# Patient Record
Sex: FEMALE | Race: WHITE | NOT HISPANIC OR LATINO | URBAN - METROPOLITAN AREA
[De-identification: names, ages, dates, MRNs, and addresses within clinical notes are randomized per-mention and may not be internally consistent; named-entity substitution may affect disease eponyms.]

---

## 2020-11-09 PROBLEM — Z00.00 ENCOUNTER FOR PREVENTIVE HEALTH EXAMINATION: Status: ACTIVE | Noted: 2020-11-09

## 2020-11-11 ENCOUNTER — OUTPATIENT (OUTPATIENT)
Dept: OUTPATIENT SERVICES | Facility: HOSPITAL | Age: 45
LOS: 1 days | End: 2020-11-11
Payer: COMMERCIAL

## 2020-11-11 ENCOUNTER — APPOINTMENT (OUTPATIENT)
Dept: ORTHOPEDIC SURGERY | Facility: CLINIC | Age: 45
End: 2020-11-11
Payer: MEDICAID

## 2020-11-11 ENCOUNTER — TRANSCRIPTION ENCOUNTER (OUTPATIENT)
Age: 45
End: 2020-11-11

## 2020-11-11 ENCOUNTER — RESULT REVIEW (OUTPATIENT)
Age: 45
End: 2020-11-11

## 2020-11-11 PROCEDURE — 73562 X-RAY EXAM OF KNEE 3: CPT | Mod: 26,50

## 2020-11-11 PROCEDURE — 73562 X-RAY EXAM OF KNEE 3: CPT

## 2020-11-11 PROCEDURE — 99203 OFFICE O/P NEW LOW 30 MIN: CPT | Mod: 25

## 2020-11-11 PROCEDURE — 20610 DRAIN/INJ JOINT/BURSA W/O US: CPT | Mod: 50

## 2020-11-11 PROCEDURE — 99072 ADDL SUPL MATRL&STAF TM PHE: CPT

## 2020-11-11 RX ORDER — HYALURONATE SODIUM 10 MG/ML
20 VIAL (ML) INTRAARTICULAR
Qty: 2 | Refills: 0 | Status: ACTIVE | COMMUNITY
Start: 2020-11-11 | End: 1900-01-01

## 2020-11-11 RX ORDER — MELOXICAM 7.5 MG/1
7.5 TABLET ORAL TWICE DAILY
Qty: 60 | Refills: 2 | Status: COMPLETED | OUTPATIENT
Start: 2020-11-11

## 2020-11-13 NOTE — PHYSICAL EXAM
[de-identified] : General: Not in acute distress, dressed appropriately, sitting on examination table\par Skin: Warm and dry, normal turgor, no rashes\par Neurological: AOx3, cranial nerves grossly intact\par Psych: Mood and affect appropriate \par \par Right Knee: Minimal effusion. No edema, erythema, redness, or drainage. Tender medially. Alignment: Neutral. 1+ Patellar crepitus. ROM: 0-110. Stable. Negative patellar compression test. 5/5 strength. DNVI. \par \par Left Knee: Minimal effusion. No edema, erythema, redness, or drainage. Tender medially. Alignment: Neutral. 1+ Patellar crepitus. ROM: 0-110. Stable. Negative patellar compression test. 5/5 strength. DNVI. \par  [de-identified] : Bilateral knee Xray reviewed and show tricompartmental OA, worst in medial compartment.

## 2020-11-13 NOTE — PROCEDURE
[de-identified] : After obtaining verbal consent and under normal sterile conditions the bilateral knee joints were injected with 4ccs of lidocaine and 1cc of 40mg/mL Kenalog.

## 2020-11-13 NOTE — END OF VISIT
[FreeTextEntry3] : By signing my name below, I, Maria Elena Nolasco, attest that this documentation has been prepared under the direction and in the presence of  Aleja Cristina MD.

## 2020-11-13 NOTE — HISTORY OF PRESENT ILLNESS
[de-identified] : 46 yo F here today for bilateral knee pain for several years, worse over the past 6 months. Rated 4/10. Seen at [facility], had steroid and gel injections in past, steroid in August 2020. Steroid injections lasted a few months but pain has returned. Pain worse with stairs and standing. Better with rest and ibuprofen. Has been giving PT in the past but has only been able to attend one session due to insurance coverage.

## 2020-11-13 NOTE — ASSESSMENT
[FreeTextEntry1] : Assessment: \par Bilateral knee OA \par \par Plan:\par The bilateral knees were injected as described above. Today they will rest, ice and use Tylenol as needed for pain. Home exercises demonstrated and reviewed. Will also rx meloxicam and Voltaren gel.\par \par F/U in 3 weeks with virtual visit.\par \par All medical record entries made by the PA/Scribe/Fellow are at my, Dr. Donn Leblanc's direction and personally dictated by me on 11/11/2020]. I have reviewed the chart and agree that the record accurately reflects my personal performance of the history, physical exam, assessment, and plan. I have also personally directed reviewed, and agreed with the chart.\par

## 2020-11-17 RX ORDER — MELOXICAM 7.5 MG/1
7.5 TABLET ORAL DAILY
Qty: 60 | Refills: 0 | Status: ACTIVE | COMMUNITY
Start: 2020-11-17 | End: 1900-01-01

## 2020-12-01 ENCOUNTER — APPOINTMENT (OUTPATIENT)
Dept: ORTHOPEDIC SURGERY | Facility: CLINIC | Age: 45
End: 2020-12-01
Payer: MEDICAID

## 2020-12-01 PROCEDURE — 99213 OFFICE O/P EST LOW 20 MIN: CPT | Mod: 95

## 2020-12-05 NOTE — ASSESSMENT
[FreeTextEntry1] : Assessment/Plan:\par \par \par 46 yo female here with severe bilateral knee osteoarthritis here s/p bilateral corticosteroid injections on 11/11/20\par \par Plan:\par \par 1) Bilateral knees were injected with corticosteroid injection as described above and pt is still experiencing worsening pain.\par 2) F/U in for in office visit\par \par

## 2020-12-05 NOTE — HISTORY OF PRESENT ILLNESS
[Home] : at home, [unfilled] , at the time of the visit. [Medical Office: ([facility])___] : at the medical office located in  [Verbal consent obtained from patient] : the patient, [unfilled] [de-identified] : 44 yo female s/p bilateral knee corticosteroid injections on 11/11/20 and bilateral knees are still hurting, injections only lasted for a few days. She reports ambulation/mobility is worsening and pain is unmanaged without pain meds. She reports no other complaints at this time.\par

## 2020-12-05 NOTE — PHYSICAL EXAM
[de-identified] : Not in acute distress, dressed appropriately, sitting on examination table \par Skin: Warm and dry, normal turgor, no rashes \par Neurological: AOx3, Cranial nerves grossly in tact \par Psych: Mood and affect appropriate \par Focused exam of the Right Knee: No swelling edema erythema redness or drainage. Diffusely tender to palpation. Non-tender w/ MCL & LCL examination. Alignment: Neutral. ROM: 0-120. Stable. 5/5 Strength. DNVI. Ambulates without devices.\par \par Focused exam of the Left Knee: No swelling edema erythema redness or drainage. Diffusely tender to palpation. Non-tender w/ MCL & LCL examination. Alignment: Neutral, Valgus, Varus. ROM: 0-120. Stable. 5/5 Strength. DNVI. Ambulates without devices.\par \par  [de-identified] : none today

## 2021-02-10 ENCOUNTER — APPOINTMENT (OUTPATIENT)
Dept: ORTHOPEDIC SURGERY | Facility: CLINIC | Age: 46
End: 2021-02-10

## 2021-02-24 ENCOUNTER — APPOINTMENT (OUTPATIENT)
Dept: ORTHOPEDIC SURGERY | Facility: CLINIC | Age: 46
End: 2021-02-24

## 2021-05-24 ENCOUNTER — APPOINTMENT (OUTPATIENT)
Dept: ORTHOPEDIC SURGERY | Facility: CLINIC | Age: 46
End: 2021-05-24

## 2021-06-10 ENCOUNTER — APPOINTMENT (OUTPATIENT)
Dept: ORTHOPEDIC SURGERY | Facility: CLINIC | Age: 46
End: 2021-06-10

## 2021-06-24 ENCOUNTER — APPOINTMENT (OUTPATIENT)
Dept: ORTHOPEDIC SURGERY | Facility: CLINIC | Age: 46
End: 2021-06-24

## 2021-07-21 ENCOUNTER — APPOINTMENT (OUTPATIENT)
Dept: ORTHOPEDIC SURGERY | Facility: CLINIC | Age: 46
End: 2021-07-21
Payer: MEDICAID

## 2021-07-21 VITALS — WEIGHT: 232 LBS | HEIGHT: 60 IN | BODY MASS INDEX: 45.55 KG/M2

## 2021-07-21 VITALS — HEIGHT: 60 IN | BODY MASS INDEX: 45.55 KG/M2 | WEIGHT: 232 LBS

## 2021-07-21 DIAGNOSIS — Z82.61 FAMILY HISTORY OF ARTHRITIS: ICD-10-CM

## 2021-07-21 DIAGNOSIS — Z72.3 LACK OF PHYSICAL EXERCISE: ICD-10-CM

## 2021-07-21 DIAGNOSIS — M25.562 PAIN IN LEFT KNEE: ICD-10-CM

## 2021-07-21 DIAGNOSIS — Z78.9 OTHER SPECIFIED HEALTH STATUS: ICD-10-CM

## 2021-07-21 DIAGNOSIS — M17.0 BILATERAL PRIMARY OSTEOARTHRITIS OF KNEE: ICD-10-CM

## 2021-07-21 DIAGNOSIS — Z86.79 PERSONAL HISTORY OF OTHER DISEASES OF THE CIRCULATORY SYSTEM: ICD-10-CM

## 2021-07-21 PROCEDURE — 73564 X-RAY EXAM KNEE 4 OR MORE: CPT | Mod: 50

## 2021-07-21 PROCEDURE — 99214 OFFICE O/P EST MOD 30 MIN: CPT

## 2021-07-21 PROCEDURE — 99072 ADDL SUPL MATRL&STAF TM PHE: CPT

## 2021-07-21 RX ORDER — BUPROPION HYDROCHLORIDE 100 MG/1
TABLET, FILM COATED ORAL
Refills: 0 | Status: ACTIVE | COMMUNITY

## 2021-07-21 NOTE — REVIEW OF SYSTEMS
[Joint Pain] : joint pain [Joint Stiffness] : joint stiffness [Joint Swelling] : joint swelling [Urinary Frequency] : urinary frequency [Headache] : headache [Anxiety] : anxiety [Depression] : depression [Negative] : Heme/Lymph

## 2021-07-21 NOTE — DISCUSSION/SUMMARY
[de-identified] : Despite her young age at 46 and coupled with her weight issue she clearly is a candidate at some point for bilateral TKA. \par I have recommended considering further Bariatric surgery to become a better candidate and have arranged for her to see Dr Breaux to discuss TKA and her prognosis and recovery time. \par Plan: \par Referred to Bariatric program at  \par Referred to Dr Breaux \par \par

## 2021-07-21 NOTE — HISTORY OF PRESENT ILLNESS
[de-identified] : 45 y/o female presents for bilateral knee pain. Patient states that she's had ongoing joint pain for two years, previous patient of Dr. Leblanc. Patient states that she has pain with walking, stairs, and bending located medially and laterally. She has joint buckling especially in the morning and states that at the end of her day after daily activities she has joint swelling. SHe is overweight and has had previous lap band which is now no longer working. \par

## 2021-07-21 NOTE — PHYSICAL EXAM
[de-identified] : Bilateral medial knee tenderness negative Maria Luisa Neg Lachman ROm 0-100 degrees. \par  [de-identified] : 4 views of both knees show significant t KL3-4 DJD knee. \par

## 2021-08-02 ENCOUNTER — APPOINTMENT (OUTPATIENT)
Dept: ORTHOPEDIC SURGERY | Facility: CLINIC | Age: 46
End: 2021-08-02